# Patient Record
Sex: MALE | Race: WHITE
[De-identification: names, ages, dates, MRNs, and addresses within clinical notes are randomized per-mention and may not be internally consistent; named-entity substitution may affect disease eponyms.]

---

## 2018-09-08 ENCOUNTER — HOSPITAL ENCOUNTER (EMERGENCY)
Dept: HOSPITAL 9 - MADERS | Age: 43
Discharge: HOME | End: 2018-09-08
Payer: COMMERCIAL

## 2018-09-08 DIAGNOSIS — N13.2: Primary | ICD-10-CM

## 2018-09-08 DIAGNOSIS — F17.210: ICD-10-CM

## 2018-09-08 DIAGNOSIS — I10: ICD-10-CM

## 2018-09-08 DIAGNOSIS — E03.9: ICD-10-CM

## 2018-09-08 DIAGNOSIS — M10.9: ICD-10-CM

## 2018-09-08 DIAGNOSIS — E11.9: ICD-10-CM

## 2018-09-08 LAB
ALBUMIN SERPL BCG-MCNC: 3.9 G/DL (ref 3.5–5)
ALP SERPL-CCNC: 64 U/L (ref 40–150)
ALT SERPL W P-5'-P-CCNC: 43 U/L (ref 8–55)
ANION GAP SERPL CALC-SCNC: 16 MMOL/L (ref 10–20)
AST SERPL-CCNC: 26 U/L (ref 5–34)
BASOPHILS # BLD AUTO: 0.2 THOU/UL (ref 0–0.2)
BASOPHILS NFR BLD AUTO: 1.7 % (ref 0–1)
BILIRUB SERPL-MCNC: 0.6 MG/DL (ref 0.2–1.2)
BUN SERPL-MCNC: 21 MG/DL (ref 8.9–20.6)
CALCIUM SERPL-MCNC: 9.6 MG/DL (ref 7.8–10.44)
CHLORIDE SERPL-SCNC: 104 MMOL/L (ref 98–107)
CO2 SERPL-SCNC: 24 MMOL/L (ref 22–29)
CREAT CL PREDICTED SERPL C-G-VRATE: 0 ML/MIN (ref 70–130)
EOSINOPHIL # BLD AUTO: 0.2 THOU/UL (ref 0–0.7)
EOSINOPHIL NFR BLD AUTO: 2.2 % (ref 0–10)
GLOBULIN SER CALC-MCNC: 3.6 G/DL (ref 2.4–3.5)
GLUCOSE SERPL-MCNC: 177 MG/DL (ref 70–105)
HGB BLD-MCNC: 13.8 G/DL (ref 14–18)
LYMPHOCYTES # BLD AUTO: 3.5 THOU/UL (ref 1.2–3.4)
LYMPHOCYTES NFR BLD AUTO: 34.5 % (ref 21–51)
MCH RBC QN AUTO: 27.9 PG (ref 27–31)
MCV RBC AUTO: 85.7 FL (ref 78–98)
MONOCYTES # BLD AUTO: 1 THOU/UL (ref 0.11–0.59)
MONOCYTES NFR BLD AUTO: 10.1 % (ref 0–10)
NEUTROPHILS # BLD AUTO: 5.2 THOU/UL (ref 1.4–6.5)
NEUTROPHILS NFR BLD AUTO: 51.6 % (ref 42–75)
PLATELET # BLD AUTO: 251 THOU/UL (ref 130–400)
POTASSIUM SERPL-SCNC: 4.1 MMOL/L (ref 3.5–5.1)
RBC # BLD AUTO: 4.95 MILL/UL (ref 4.7–6.1)
SODIUM SERPL-SCNC: 140 MMOL/L (ref 136–145)
WBC # BLD AUTO: 10.1 THOU/UL (ref 4.8–10.8)

## 2018-09-08 PROCEDURE — 74176 CT ABD & PELVIS W/O CONTRAST: CPT

## 2018-09-08 PROCEDURE — 36415 COLL VENOUS BLD VENIPUNCTURE: CPT

## 2018-09-08 PROCEDURE — 96374 THER/PROPH/DIAG INJ IV PUSH: CPT

## 2018-09-08 PROCEDURE — 85025 COMPLETE CBC W/AUTO DIFF WBC: CPT

## 2018-09-08 PROCEDURE — 80053 COMPREHEN METABOLIC PANEL: CPT

## 2018-09-08 PROCEDURE — 96361 HYDRATE IV INFUSION ADD-ON: CPT

## 2018-09-08 PROCEDURE — 96376 TX/PRO/DX INJ SAME DRUG ADON: CPT

## 2018-09-08 NOTE — CT
PRELIMINARY REPORT/[facility]/EMERGENCY AFTER

HOURS PROCEDURE: 

 

EXAM:

CT Abdomen and Pelvis Without Intravenous Contrast

 

CLINICAL HISTORY:

43 years old, male; Pain; Abdominal pain; Localized; Lower

 

TECHNIQUE:

Axial computed tomography images of the abdomen and pelvis without intravenous contrast. Coronal and 
sagittal reformatted images were created and reviewed.

 

COMPARISON:

No relevant prior studies available.

 

FINDINGS:

Lung bases: No acute findings. No mass. No consolidation.

ABDOMEN:

Liver: Fatty infiltration of the liver. Few areas of fatty sparing.

Gallbladder and bile ducts: No calcified stones. No ductal dilation.

Pancreas: No ductal dilation. No mass.

Spleen: No mass.

Adrenals: No mass.

Kidneys and ureters: 2 mm stone in the left proximal ureter with hydroureteronephrosis and perinephri
c/ureteral stranding. No right sided stones. No right hydronephrosis. 

Stomach and bowel: No evidence of bowel obstruction. Diverticulosis. Underdistended colon segments.

PELVIS:

Appendix: No findings to suggest acute appendicitis.

Bladder: No stones.

Reproductive: No acute findings.

ABDOMEN and PELVIS:

Intraperitoneal space: No free air. No significant fluid collection.

Bones/joints: No acute fracture.

Soft tissues: No acute findings.

Vasculature: No acute findings. No abdominal aortic aneurysm.

Lymph nodes: No lymphadenopathy.

 

IMPRESSION:

Left ureteral obstructing stone.

 

Thank you for allowing us to participate in the care of your patient.

Dictated and Authenticated by: Nicola Carty MD

09/08/2018 5:09 AM Central Time ([place] & [place])

 

 

 

FINAL REPORT 

 

ABDOMEN AND PELVIC CT SCAN WITHOUT IV CONTRAST:

EMERGENCY AFTER HOURS STUDY

 

TIME: 4:45 a.m.

DATE: 9/8/18.

 

FINDINGS/IMPRESSION: 

Approximately 0.2 cm diameter obstructing left ureter calculus with some proximal hydronephrosis and 
periureteral and perirenal fat stranding.  Marked fatty changes of the liver with hepatomegaly and so
me associated fat sparring adjacent to the gallbladder.  Normal-appearing appendix.  No other signifi
cant acute process.

 

POS: [facility]

## 2018-09-19 ENCOUNTER — HOSPITAL ENCOUNTER (OUTPATIENT)
Dept: HOSPITAL 92 - SDC | Age: 43
Discharge: HOME | End: 2018-09-19
Attending: SURGERY
Payer: COMMERCIAL

## 2018-09-19 VITALS — BODY MASS INDEX: 42.8 KG/M2

## 2018-09-19 DIAGNOSIS — K40.20: Primary | ICD-10-CM

## 2018-09-19 DIAGNOSIS — E11.9: ICD-10-CM

## 2018-09-19 DIAGNOSIS — K42.9: ICD-10-CM

## 2018-09-19 PROCEDURE — 96374 THER/PROPH/DIAG INJ IV PUSH: CPT

## 2018-09-19 PROCEDURE — C1781 MESH (IMPLANTABLE): HCPCS

## 2018-09-19 PROCEDURE — 0WUF0JZ SUPPLEMENT ABDOMINAL WALL WITH SYNTHETIC SUBSTITUTE, OPEN APPROACH: ICD-10-PCS | Performed by: SURGERY

## 2018-09-19 PROCEDURE — 0YUA4JZ SUPPLEMENT BILATERAL INGUINAL REGION WITH SYNTHETIC SUBSTITUTE, PERCUTANEOUS ENDOSCOPIC APPROACH: ICD-10-PCS | Performed by: SURGERY

## 2018-09-20 NOTE — OP
DATE OF PROCEDURE:  09/19/2018

 

PREOPERATIVE DIAGNOSES:  Left inguinal hernia, umbilical hernia.

 

POSTOPERATIVE DIAGNOSES:  Bilateral inguinal hernia, umbilical hernia.

 

PROCEDURES PERFORMED:  

1.  Da Anna laparoscopic bilateral inguinal hernia repair with mesh, 3DMax large.

2.  Umbilical hernia repair with mesh, open Ventralex ST small.

 

SURGEON:  Alexys Grace M.D.

 

ANESTHESIA:  General.

 

ESTIMATED BLOOD LOSS:  Minimal.

 

COMPLICATIONS:  None.

 

SPECIMEN:  None.

 

FINDINGS:  Bilateral indirect inguinal hernia, umbilical hernia.

 

TECHNIQUE:  The patient was taken to the operating room and placed supine on the table.  After genera
l anesthetic was obtained, the Page catheter was placed.  The abdomen was shaved, prepped, and drape
d in a sterile fashion.  Curved incision made above the umbilicus, cautery used to dissect down to an
d score the fascia.  Abdominal cavity entered bluntly using a Delicia clamp.  An 11 mm balloon applied,
 medical trocar was placed and high-flow pneumoperitoneum was obtained.  Left and right abdominal 8 m
m robot trocars were placed.  All ports were docked to the robot.  Surgeon goes to the console.  The 
patient had bilateral inguinal hernias.  The bilateral preperitoneal space is entered by incising the
 peritoneum.  The preperitoneal space is bluntly dissected, the pubic tubercle medially and to superi
or iliac crest laterally.  The bilateral indirect inguinal hernias were dissected out of the inguinal
 canal up high onto the peritoneum.  There were no direct defects.  There were no femoral defects.  3
DMax large mesh was brought into the sterile field, placed in the abdomen.  The end labeled medial as
pect is placed over pubic tubercles bilateral, the meshes laid out to cover the femoral indirect and 
direct components.  The mesh is sewn with a Vicryl suture to the pubic tubercle medially and laterall
y to the posterior fascia lateral to the inferior epigastric vessels.  The bilateral peritoneum is th
en reapproximated using running 3-0 Stratafix.  All ports sites infiltrated using local anesthetic.  
All ports were removed under camera visualization.  Pneumoperitoneum was let down.  In the area of th
e supraumbilical incision the umbilical skin is amputated exposing umbilical defect.  The Ventralex S
T small mesh brought into the sterile field, placed in this abdomen.  Its tails laid out laterally.  
Tails are sewn via U stitch of permanent braided suture to the edges of the fascia.  The tails were t
hen cut at the level of the fascia.  The fascia was closed loosely over the mesh.  The umbilical skin
 is tacked back down to the fascia using 3-0 Vicryl.  The wound was irrigated.  Local anesthetic was 
applied.  All wounds are closed using 4-0 Monocryl and Dermabond.  The patient was en route to recove
ry in stable condition.  All instrument counts, needle counts, lap counts 

were correct.